# Patient Record
Sex: FEMALE | Race: WHITE | NOT HISPANIC OR LATINO | Employment: FULL TIME | URBAN - METROPOLITAN AREA
[De-identification: names, ages, dates, MRNs, and addresses within clinical notes are randomized per-mention and may not be internally consistent; named-entity substitution may affect disease eponyms.]

---

## 2023-09-13 ENCOUNTER — HOSPITAL ENCOUNTER (OUTPATIENT)
Dept: DATA CONVERSION | Facility: HOSPITAL | Age: 58
End: 2023-09-13
Attending: ORTHOPAEDIC SURGERY | Admitting: ORTHOPAEDIC SURGERY
Payer: COMMERCIAL

## 2023-09-13 DIAGNOSIS — M19.031 PRIMARY OSTEOARTHRITIS, RIGHT WRIST: ICD-10-CM

## 2023-09-13 DIAGNOSIS — M24.541 CONTRACTURE, RIGHT HAND: ICD-10-CM

## 2023-09-13 DIAGNOSIS — M18.11 UNILATERAL PRIMARY OSTEOARTHRITIS OF FIRST CARPOMETACARPAL JOINT, RIGHT HAND: ICD-10-CM

## 2023-09-29 VITALS — WEIGHT: 149.91 LBS | HEIGHT: 60 IN | BODY MASS INDEX: 29.43 KG/M2

## 2023-09-30 NOTE — H&P
"    History & Physical Reviewed:   Pregnant/Lactating:  ·  Are You Pregnant no (1)   ·  Are You Currently Breastfeeding no (1)     I have reviewed the History and Physical dated:  11-Sep-2023   History and Physical reviewed and relevant findings noted. Patient examined to review pertinent physical  findings.: No significant changes   Home Medications Reviewed: no changes noted   Allergies Reviewed: no changes noted     Consent:   COVID-19 Consent:  ·  COVID-19 Risk Consent Surgeon has reviewed key risks related to the risk of kat COVID-19 and if they contract COVID-19 what the risks are.       Electronic Signatures:  Karan Bobo ()  (Signed 13-Sep-2023 10:03)   Authored: History & Physical Reviewed, Consent, Note  Completion      Last Updated: 13-Sep-2023 10:03 by Karan Bobo ()    References:  1.  Data Referenced From \"Patient Profile - Preop v3\" 13-Sep-2023 09:06   "

## 2023-10-01 NOTE — OP NOTE
Post Operative Note:     Post-Procedure Diagnosis: Right thumb CMC arthritis.   Right wrist STT arthritis.   Procedure: 1.   2.   3.   4.   5.   Surgeon: Karan Bobo D.O.   Resident/Fellow/Other Assistant: CHARLINE Stauffer   Estimated Blood Loss (mL): Less than 10 cc   Specimen: no   Findings: Right thumb CMC arthritis.  Right wrist  STT arthritis.     Operative Report Dictated:  Dictation: not applicable - note contains Operative  Report   Operative Report:    Preoperative diagnosis: Right thumb CMC osteoarthritis.  Right hand first webspace contracture..  Right wrist STT arthritis.    Postoperative diagnosis: Right thumb CMC osteoarthritis.  Right hand first webspace contracture.Right wrist STT arthritis.    Procedure planned: Right thumb CMC arthroplasty using Arthrex mini tight rope construct with possible tendon transfer.Partial excision trapezoid for treatment of right wrist arthritis.    Procedure performed: Right thumb CMC arthroplasty using Arthrex mini tight rope construct.  End to side tendon transfer of abductor pollicis longus to flexor carpi radialis for treatment of webspace contracture.Partial resection trapezoid for treatment  of right wrist arthritis.    Surgeon: Karan Bobo D.O.    Assistant: CHARLINE Stauffer  The physician assistant was present to the entire case. Given the nature of the disease process and the procedure to be performed a skilled surgical assistant was necessary during the case. The assistant was necessary in order to hold retractors and directly  assist in the operation. A certified scrub tech was at the back table managing instruments and supplies for the surgical case.    Anesthesia: General    Estimated blood loss: Less than 20 mL    Drains: None    Tourniquet: Approximately 30 minutes at 250 mmHg to the well-padded upper arm    Specimens: None    Implants: Arthrex mini tight rope with FiberWire suture and stainless steel endobuttons    Indications: The patient  presented to the office with painful arthritis affecting right thumb CMC articulation.  The patient described pain and dysfunction on a daily basis.  The patient experienced failure of nonoperative treatment strategies.  After  full discussion regarding risks benefits and alternatives the patient elected to proceed forth with surgery by way of right thumb CMC arthroplasty using Arthrex mini tight rope construct possibly augmented with tendon transfer.  Informed consent was signed  and placed in the chart.    Complications: None noted at the time of surgery    Description of operation: The patient was taken to the operative suite and placed in the supine position on the operating table.  A timeout was performed and the right hand was confirmed to be the operative site.  The patient was carefully positioned  on the table in such a fashion as to pad all bony prominences and peripheral nerves.  The patient was administered appropriate IV antibiotics and general anesthesia.  The patient was then prepped and draped in the normal sterile fashion.  After prepping  and draping an apex volar chevron shaped incision was marked out over the base of the thumb centered over the CMC articulation.  A 1 cm incision was marked out over the metaphyseal diaphyseal junction of the dorsal second metacarpal slightly ulnar to  midline.  A sterile Esmarch was then used to exsanguinate the upper extremity and tourniquet was raised to 250 mmHg. The 15 blade was then used to incise skin over the base of the thumb.  Tenotomy scissors were used to gently dissect down to the subcutaneous  plane, taking care to identify and protect the dorsal sensory branches of the radial nerve.  The sensory branches were protected through the remainder of the case.  We then developed the interval between the extensor pollicis brevis and abductor pollicis  longus.  With the tendons retracted we created a full-thickness capsular incision down to bone directly  overlying the CMC articulation.  Sharp dissection was used to release the soft tissue attachments to the trapezium.  An osteotome was then used to  quarter the trapezium and the trapezium fragments were then removed in a piecemeal fashion with the Rongeur.  Care was taken to avoid iatrogenic injury to the underlying FCR tendon and capsular structures.  The interface between the distal pole scaphoid  and trapezoid was then inspected.  There was found to be full-thickness cartilaginous loss with eburnated subchondral bone.  The rongeur was used to remove the proximal third of the trapezoid eliminating the painful contact in this zone of wrist arthritis.   Range of motion was undertaken and it was demonstrated no contact did occur through range of motion even with axial load.  The guidepin from the Arthrex mini tight rope set was then placed about the radial base of the first metacarpal.  Under fluoroscopic  imaging the pin was delivered from radial to ulnar and proximal to distal obtaining bicortical purchase within the first metacarpal base and second metacarpal shaft.  Care was taken to pass the pin so that it would exit about the ulnar aspect of the second  metacarpal shaft.  We then loaded the suture for the definitive implant through the wire loop of the trailing edge of the guidepin.  The guidepin was then advanced along with the definitive suture construct through a small incision in the dorsal hand.   Dissection was carried down to the second metacarpal shaft exposing a small window of periosteum over the dorsal ulnar aspect of the second metacarpal shaft.  The second stainless steel endo-button was then tied down over the ulnar aspect of the second  metacarpal shaft taking care to set the appropriate tone within the construct.  Fluoroscopic imaging, dynamic assessment and direct inspection were used to set the appropriate tone within the construct avoiding overtightening or undertaking.  Despite  the fact  that the first metacarpal was positioned in the appropriate posture upon tightening there was still evidence for adduction deformity of the metacarpal and webspace contracture.  It was deemed most appropriate to perform end to side transfer of  abductor pollicis longus to flexor carpi radialis tendon in order to impart a greater extension and abduction posture to the metacarpal which would lead to better function.  #2 Ethibond suture was used to create a suture suspension sling by passing suture  to the FCR tendon just proximal to the point where it passes volar to the second metacarpal and also through the abductor pollicis longus tendon at the point where it inserts at the radial aspect of the first metacarpal base, creating 2 passes through  each tendon.  When tightened the suture suspension sling improved the posture of the first metacarpal creating more abduction and extension copious irrigation was then performed.  The tourniquet was deflated.  Meticulous hemostasis was achieved.  Vital  stitches were used to close the capsular layer over the CMC articulation.  Interrupted nylon stitches were used to close the skin.  The patient was then placed into a nonadherent dressing and thumb spica short arm plaster splint.  The patient was then  allowed to arise from general anesthesia and taken to recovery in stable condition.  Overall the patient tolerated the procedure well.    Disposition: Stable to PACU        Electronic Signatures:  Karan Bobo ()  (Signed 13-Sep-2023 13:05)   Authored: Post Operative Note, Note Completion      Last Updated: 13-Sep-2023 13:05 by Karan Bobo ()

## 2023-10-04 PROBLEM — M79.18 MYOFASCIAL PAIN: Status: ACTIVE | Noted: 2023-10-04

## 2023-10-04 PROBLEM — F52.6 SEXUAL PAIN DISORDER: Status: ACTIVE | Noted: 2023-10-04

## 2023-10-04 PROBLEM — G56.00 CTS (CARPAL TUNNEL SYNDROME): Status: ACTIVE | Noted: 2023-10-04

## 2023-10-04 PROBLEM — M65.9 FCR (FLEXOR CARPI RADIALIS) TENOSYNOVITIS: Status: ACTIVE | Noted: 2023-10-04

## 2023-10-04 PROBLEM — N95.2 ATROPHIC VAGINITIS: Status: ACTIVE | Noted: 2023-10-04

## 2023-10-04 RX ORDER — MONTELUKAST SODIUM 10 MG/1
10 TABLET ORAL DAILY
COMMUNITY
Start: 2023-09-02

## 2023-10-04 RX ORDER — OXYCODONE AND ACETAMINOPHEN 5; 325 MG/1; MG/1
TABLET ORAL
COMMUNITY
Start: 2023-09-11

## 2023-10-04 RX ORDER — ESTRADIOL 10 UG/1
10 INSERT VAGINAL 2 TIMES WEEKLY
COMMUNITY
Start: 2021-06-11

## 2023-10-04 RX ORDER — ENOXAPARIN SODIUM 100 MG/ML
INJECTION SUBCUTANEOUS
COMMUNITY
Start: 2023-04-24

## 2023-10-04 RX ORDER — LIDOCAINE 50 MG/G
1 PATCH TOPICAL EVERY 24 HOURS
COMMUNITY
Start: 2023-08-16

## 2023-10-04 RX ORDER — BREMELANOTIDE 1.75 MG/.3ML
INJECTION SUBCUTANEOUS
COMMUNITY
Start: 2021-06-10

## 2023-10-04 RX ORDER — ONDANSETRON 4 MG/1
TABLET, FILM COATED ORAL
COMMUNITY
Start: 2023-04-24

## 2023-10-04 RX ORDER — DICLOFENAC SODIUM 10 MG/G
GEL TOPICAL
COMMUNITY
Start: 2023-09-08

## 2023-10-04 RX ORDER — CEFADROXIL 500 MG/1
CAPSULE ORAL
COMMUNITY
Start: 2023-04-24

## 2023-10-04 RX ORDER — OXYCODONE HYDROCHLORIDE 5 MG/1
TABLET ORAL
COMMUNITY
Start: 2023-04-24

## 2023-10-05 ENCOUNTER — TREATMENT (OUTPATIENT)
Dept: OCCUPATIONAL THERAPY | Facility: CLINIC | Age: 58
End: 2023-10-05
Payer: COMMERCIAL

## 2023-10-05 DIAGNOSIS — M18.11 PRIMARY OSTEOARTHRITIS OF FIRST CARPOMETACARPAL JOINT OF RIGHT HAND: Primary | ICD-10-CM

## 2023-10-05 PROCEDURE — 97140 MANUAL THERAPY 1/> REGIONS: CPT | Mod: GO | Performed by: OCCUPATIONAL THERAPIST

## 2023-10-05 PROCEDURE — 97110 THERAPEUTIC EXERCISES: CPT | Mod: GO | Performed by: OCCUPATIONAL THERAPIST

## 2023-10-05 PROCEDURE — 97530 THERAPEUTIC ACTIVITIES: CPT | Mod: GO | Performed by: OCCUPATIONAL THERAPIST

## 2023-10-05 NOTE — PROGRESS NOTES
"  Occupational Therapy     Occupational Therapy Treatment  Marion Montoya   [id number]   1965          Current Problem:   Right CMC OA s/p arthroplasty surgery       Visit Number: 2    Insurance Authorization Request: N/A    Assessment:  Decreased ADL status, Decreased upper extremity range of motion, Decreased upper extremity strength, Decreased fine motor control, Decreased IADLs   Patient is progressing appropriately demonstrated by decreased pain at rest. She remains limited by pain, edema, joint stiffness, capsular tightness, and muscle weakness. Added wrist and digits A/AAROM exercsies to today's session and educated patient on benefits of performing light functional activities with right hand to facilitate motion and control pain. Patient will continue to benefit from skilled OT for pain/edema management, ROM, and later progress to strengthening to support full return to all ADL/IADL, work, and leisure activities.        Plan:  Outpatient Plan  OT Plan: Continue with PoC  Frequency: 1x/wk  Duration: 6 weeks  Rehab Potential: Good  Plan of Care Agreement: Patient      Subjective   General:  \"My fingers are bending more now and I can do small circles with my thumb.\"   \"I can type for work but I can't use my thumb.\"    Precautions: No heavy lifting/grasping, No WB      Pain Assessment:  Location: right thumb and radial side wrist.   3-4/10 at rest, 8/10 at highest  Description: Shooting, sharp, burning       HEP Adherence/Understanding: Good     Objective  Observation: Mod edema presented in right radial side wrist, basal thumb, and all digits. Guarding behavior observed towards right UE.    Palpation: Tender to touch over dorsum right wrist.     Range of motion (in degrees)  AROM  Right wrist ext 30 flex 40 sup 60 pro 85  Right digits to DPC IF 2 cm  MF 2 cm  RF 3 cm  SF 3 cm   Right thumb MCP 0/0  IP 0/10  Right thumb radial ABD 30 palmar ABD 25  Right thumb opposition IF     Sensory:  Patient reports " numbness along radial side forearm, wrist, and thumb     Strength: TBT due to pain   strength R   L   Pinch strength Lateral pinch R   L     Treatment Interventions:   Therapeutic Exercise  Therapeutic Exercise Performed: Yes   Therapist completed PROM of digits into isolated and composite flexion using low load long duration technique. Instructed patient to self perform wrist AAROM ball roll flex/ext/sup; sponge squeeze in elevation; digits into ABD.   Therapeutic Activity  Therapeutic Activity Performed: Yes   Sponge cubes picking up and trapping  Manual Therapy Performed: Yes    Retrograde edema mob with right hand in elevation. Issued patient size small compression glove to wear at night for edema control.     Tolerance of session: Some difficulty due to pain    Outcome Measures:  Quick Dash Score: at eval 81.82%     OP EDUCATION:  Education  Individual(s) Educated: Patient  Education Provided: Edema control  Home Program: AAROM, AROM, Handout issued  Patient/Caregiver Demonstrated Understanding: yes  Plan of Care Discussed and Agreed Upon: yes  Patient Response to Education: Patient/Caregiver Verbalized Understanding of Information, Patient/Caregiver Performed Return Demonstration of Exercises/Activities, Patient/Caregiver Asked Appropriate Questions    Goals: (by discharge 6 weeks)  By discharge MELANIE ORNELAS will achieve the following goals:     1. Patient to demonstrate AROM thumb MCP flexion 20 IP 30 degrees for dressing   2. Patient to demonstrate AROM wrist ext 40 degrees for grooming and pushing self up.   3. Patient to lift and carry 10# with right hand with no difficulty for household chores  4. Patient to demonstrate lateral pinching strength right same with left for cooking and reading tasks.  5. Patient to demonstrate proper technique and competence with HEP   6. Patient to score disability rate less than 10% on Quick DASH

## 2023-10-12 ENCOUNTER — TREATMENT (OUTPATIENT)
Dept: OCCUPATIONAL THERAPY | Facility: CLINIC | Age: 58
End: 2023-10-12
Payer: COMMERCIAL

## 2023-10-12 DIAGNOSIS — M18.11 PRIMARY OSTEOARTHRITIS OF FIRST CARPOMETACARPAL JOINT OF RIGHT HAND: Primary | ICD-10-CM

## 2023-10-12 PROCEDURE — 97110 THERAPEUTIC EXERCISES: CPT | Mod: GO | Performed by: OCCUPATIONAL THERAPIST

## 2023-10-12 NOTE — PROGRESS NOTES
"  Occupational Therapy     Occupational Therapy Treatment  Marion Montoya   [id number]   1965   Time Calculation  Start Time: 1100  Stop Time: 1145  Time Calculation (min): 45 min     Current Problem:   Right CMC OA s/p arthroplasty surgery     Visit Number: 3    Insurance Authorization Request: N/A    Assessment:  Decreased ADL status, Decreased upper extremity range of motion, Decreased upper extremity strength, Decreased fine motor control, Decreased IADLs   Patient is progressing appropriately demonstrated by improved MF-SF AROM into flexion, wrist into flex/ext/sup. She remains limited by pain, edema, joint stiffness, capsular tightness, and muscle weakness. Added thumb PROM and MPC blocking exercises to today's session to decrease joint stiffness and capsular tightness. Adjusted thumb spica splint to hand based to allow full wrist motion. Also issued her a medium compression glove to wear at night as she reports that size small glove causes pain. Patient will continue to benefit from skilled OT for pain/edema management, ROM, and later progress to strengthening to support full return to all ADL/IADL, work, and leisure activities.        Plan:  Outpatient Plan  OT Plan: Continue with PoC  Frequency: 1x/wk  Duration: 6 weeks  Rehab Potential: Good  Plan of Care Agreement: Patient      Subjective   General:  \"I can move my thumb better but it still hurts. I can hold a plate now. I can eat chips this this hand. I can't do it for too long but I am doing more.\"    Precautions: No heavy lifting/grasping, No WB    Pain Assessment:  Location: right thumb and radial side wrist.   4/10 at rest, 7/10 (was 8) at highest  Description: Burning, achy and shooting.      HEP Adherence/Understanding: Good     Objective  Observation: Mod edema presented in right radial side wrist, basal thumb, and all digits. Less guarding behavior observed towards right UE comparing to last visit.    Palpation: Tender to touch over dorsum right " wrist.     Range of motion (in degrees)  AROM  Right wrist ext 35 (+5) flex 45 (+5)  sup 70 (+10) pro 85  Right digits to DPC IF 2 cm  MF 0 cm (was 2 cm)  RF 0 cm (was 3 cm)  SF 0 cm (was 3 cm)   Right thumb MCP 0/0  IP 0/15 (+5)  Right thumb radial ABD 30 palmar ABD 25  Right thumb opposition MF (was to IF)     Sensory:  Patient reports decreased numbness along radial side forearm, wrist, and thumb     Strength: TBT due to pain   strength R   L   Pinch strength Lateral pinch R   L     Treatment Interventions:   Therapeutic Exercise  Therapeutic Exercise Performed: Yes   Therapist completed PROM of thumb MCP into flexion and IF into composite flexion using low load long duration technique. Instructed patient to self perform thumb MCP flexion stretch, thumb MCP blocking with 5 sec hold at end range; opposition to RF fingertip with 5 sec hold at end range.   Therapeutic Activity  Therapeutic Activity Performed: Yes   9 hole peg placement with normal pinching pattern and opposition IF-RF.       Tolerance of session: Some difficulty due to pain    Outcome Measures:  Quick Dash Score: at eval 81.82%     OP EDUCATION:  Education  Individual(s) Educated: Patient  Education Provided: Edema control  Home Program: PROM, AROM, Edema control, Handout issued  Patient/Caregiver Demonstrated Understanding: yes  Plan of Care Discussed and Agreed Upon: yes  Patient Response to Education: Patient/Caregiver Verbalized Understanding of Information, Patient/Caregiver Performed Return Demonstration of Exercises/Activities, Patient/Caregiver Asked Appropriate Questions    Goals: (by discharge 6 weeks)  By discharge MELANIE ORNELAS will achieve the following goals:     1. Patient to demonstrate AROM thumb MCP flexion 20 IP 30 degrees for dressing   2. Patient to demonstrate AROM wrist ext 40 degrees for grooming and pushing self up.   3. Patient to lift and carry 10# with right hand with no difficulty for household chores  4. Patient to  demonstrate lateral pinching strength right same with left for cooking and reading tasks.  5. Patient to demonstrate proper technique and competence with HEP   6. Patient to score disability rate less than 10% on Quick DASH

## 2023-10-19 ENCOUNTER — TREATMENT (OUTPATIENT)
Dept: OCCUPATIONAL THERAPY | Facility: CLINIC | Age: 58
End: 2023-10-19
Payer: COMMERCIAL

## 2023-10-19 DIAGNOSIS — M18.11 PRIMARY OSTEOARTHRITIS OF FIRST CARPOMETACARPAL JOINT OF RIGHT HAND: Primary | ICD-10-CM

## 2023-10-19 PROCEDURE — 97110 THERAPEUTIC EXERCISES: CPT | Mod: GO | Performed by: OCCUPATIONAL THERAPIST

## 2023-10-19 NOTE — PROGRESS NOTES
"  Occupational Therapy     Occupational Therapy Treatment  Marion Montoya   [id number]   1965   Time Calculation  Start Time: 1100  Stop Time: 1145  Time Calculation (min): 45 min     Current Problem:   Right CMC OA s/p arthroplasty surgery     Visit Number: 4    Insurance Authorization Request: N/A    Assessment:  Decreased ADL status, Decreased upper extremity range of motion, Decreased upper extremity strength, Decreased IADLs   Patient is progressing appropriately demonstrated by reduced pain and improved AROM thumb opposition. She remains limited by pain, edema, joint stiffness, capsular tightness, and muscle weakness. Added [employer] [place] dexterity board activities to today's session to promote in-hand manipulation and dexterity skills. Patient will continue to benefit from skilled OT for pain/edema management, ROM, and later progress to strengthening to support full return to all ADL/IADL, work, and leisure activities.        Plan:  Outpatient Plan  OT Plan: Continue with PoC  Frequency: 1x/wk  Duration: 6 weeks  Rehab Potential: Good  Plan of Care Agreement: Patient      Subjective   General:  \"I opened a dog food can by myself today. I can hold things better.\" \"Typing is still hard, it hurts my index finger.\"     Precautions: No heavy lifting/grasping, No WB    Pain Assessment:  Location: right thumb and radial side wrist.   2-3/10 at rest (was 3-4), 6-7/10 (was 7) at highest  Description: Burning, achy and shooting.      HEP Adherence/Understanding: Good     Objective  Observation: Mod edema presented in right radial side wrist, basal thumb, and all digits. Less guarding behavior observed towards right UE comparing to last visit.    Palpation: Minimal tenderness to touch over dorsum right wrist.     Range of motion (in degrees)  AROM  Right wrist ext 35 flex 45  sup 70 pro 85  Right digits to DPC IF 2 cm  MF 0 cm  RF 0 cm SF 0 cm   Right thumb MCP 0/0  IP 0/25 (+10)  Right thumb radial ABD 30 palmar ABD 25  Right " thumb opposition RF (was to MF)     Sensory:  Patient reports no more numbness in right hand     Strength: TBT due to pain   strength R   L   Pinch strength Lateral pinch R   L     Treatment Interventions:   Therapeutic Exercise  Therapeutic Exercise Performed: Yes   Therapist completed PROM of thumb MCP into flexion and IF into composite flexion using low load long duration technique. Instructed patient to self perform thumb MCP blocking with 5 sec hold at end range; opposition to SF fingertip with 5 sec hold at end range.   Therapeutic Activity  Therapeutic Activity Performed: Yes   [employer] dexterity board - peg placement and trap with placement      Tolerance of session: Some difficulty due to pain    Outcome Measures:  Quick Dash Score: at eval 81.82%     OP EDUCATION:  Education  Individual(s) Educated: Patient  Home Program: AROM, PROM  Patient/Caregiver Demonstrated Understanding: yes  Plan of Care Discussed and Agreed Upon: yes  Patient Response to Education: Patient/Caregiver Verbalized Understanding of Information, Patient/Caregiver Performed Return Demonstration of Exercises/Activities, Patient/Caregiver Asked Appropriate Questions    Goals: (by discharge 6 weeks)  By discharge MELANIE ORNELAS will achieve the following goals:     1. Patient to demonstrate AROM thumb MCP flexion 20 IP 30 degrees for dressing   2. Patient to demonstrate AROM wrist ext 40 degrees for grooming and pushing self up.   3. Patient to lift and carry 10# with right hand with no difficulty for household chores  4. Patient to demonstrate lateral pinching strength right same with left for cooking and reading tasks.  5. Patient to demonstrate proper technique and competence with HEP   6. Patient to score disability rate less than 10% on Quick DASH

## 2023-10-24 ENCOUNTER — OFFICE VISIT (OUTPATIENT)
Dept: ORTHOPEDIC SURGERY | Facility: CLINIC | Age: 58
End: 2023-10-24
Payer: COMMERCIAL

## 2023-10-24 ENCOUNTER — ANCILLARY PROCEDURE (OUTPATIENT)
Dept: RADIOLOGY | Facility: CLINIC | Age: 58
End: 2023-10-24
Payer: COMMERCIAL

## 2023-10-24 DIAGNOSIS — M18.11 PRIMARY OSTEOARTHRITIS OF FIRST CARPOMETACARPAL JOINT OF RIGHT HAND: Primary | ICD-10-CM

## 2023-10-24 DIAGNOSIS — M18.11 PRIMARY OSTEOARTHRITIS OF FIRST CARPOMETACARPAL JOINT OF RIGHT HAND: ICD-10-CM

## 2023-10-24 PROCEDURE — 99024 POSTOP FOLLOW-UP VISIT: CPT | Performed by: ORTHOPAEDIC SURGERY

## 2023-10-24 PROCEDURE — 73140 X-RAY EXAM OF FINGER(S): CPT | Mod: RIGHT SIDE | Performed by: ORTHOPAEDIC SURGERY

## 2023-10-24 PROCEDURE — 73140 X-RAY EXAM OF FINGER(S): CPT | Mod: RT

## 2023-10-24 NOTE — PROGRESS NOTES
10/24/2023    Chief Complaint   Patient presents with    Left Hand - Follow-up     Lt thumb cmc arthroplasty  DOS: 9/1/323  Xrays today       History of Present Illness:  Patient Marion Montoya , 58 y.o. female, presents today, 10/24/2023, for evaluation of right thumb  CMC arthroplasty, 6 weeks postop .  Marion states she is doing well overall.  Minimal to moderate soreness and stiffness that continues to improve, she states this is been especially noticeable over the last week or so.  She working with therapy and motion recovery, they have also slammed down her brace.  Overall she feels she is making progress of progress.       Review of Systems:   GENERAL: Negative  GI: Negative  MUSCULOSKELETAL: See HPI  SKIN: Negative  NEURO:  Negative     Physical Exam:  GENERAL:  Alert and oriented to person, place, and time.  No acute distress and breathing comfortably; pleasant and cooperative with the examination.  HEENT:  Head is normocephalic and atraumatic.  NECK:  Supple, no visible swelling.  CARDIOVASCULAR:  No palpable tachycardia.  LUNGS:  No audible wheezing or labored breathing.  ABDOMEN:  Nondistended.  Extremities: Evaluation of the right upper extremity finds the patient to have a palpable radial artery at the wrist with brisk capillary refill to all digits. The patient has intact sensorium to axillary, radial, median and ulnar nerves. There are no open wounds. There are no signs of infection. There is no evidence of lymphedema or lymphatic streaking. The patient has supple compartments of the right arm, forearm and hand.  She does have mild diffuse swelling to the digits and hand, this continues to improve for the patient.  Surgical incisions are well-healed.  She has full composite fist no extensor lag or rotational deformity.  With flexion of the thumb she can comfortably reach the tip of the ring finger she has to actively work to reach the tip of the small finger.     Imaging:  3 views of the right thumb  taken in the [place] office today show no acute fracture dislocation.  Previous surgical resection of trapezium is noted, indwelling Endobutton construct shows adequate maintenance of spacing and alignment throughout all 3 planes.     Assessment:  Right thumb CMC arthroplasty 6 weeks postop with minimal stiffness.     Plan:  Recommendations are made wean from splint immobilization.  Progress to 1 to 2 pounds maximum weightbearing to the right upper extremity.  Continue work with therapy on range of motion, endurance, strengthening under OT guidance.  Follow-up again with our office in 6 weeks for repeat clinical and radiographic exam.  X-rays 3 views right thumb upon return.  All questions answered at today's visit.    Carey Orta PA-C

## 2023-10-26 ENCOUNTER — TREATMENT (OUTPATIENT)
Dept: OCCUPATIONAL THERAPY | Facility: CLINIC | Age: 58
End: 2023-10-26
Payer: COMMERCIAL

## 2023-10-26 DIAGNOSIS — M18.11 PRIMARY OSTEOARTHRITIS OF FIRST CARPOMETACARPAL JOINT OF RIGHT HAND: Primary | ICD-10-CM

## 2023-10-26 PROCEDURE — 97530 THERAPEUTIC ACTIVITIES: CPT | Mod: GO | Performed by: OCCUPATIONAL THERAPIST

## 2023-10-26 PROCEDURE — 97110 THERAPEUTIC EXERCISES: CPT | Mod: GO | Performed by: OCCUPATIONAL THERAPIST

## 2023-10-26 PROCEDURE — 97140 MANUAL THERAPY 1/> REGIONS: CPT | Mod: GO | Performed by: OCCUPATIONAL THERAPIST

## 2023-10-26 NOTE — PROGRESS NOTES
"  Occupational Therapy     Occupational Therapy Treatment  Marion Montoya   [id number]   1965   Time Calculation  Start Time: 1100  Stop Time: 1145  Time Calculation (min): 45 min     Current Problem:   Right CMC OA s/p arthroplasty surgery     Visit Number: 5    Insurance Authorization Request: N/A    Assessment:  Decreased ADL status, Decreased upper extremity range of motion, Decreased upper extremity strength, Decreased IADLs   Patient is progressing appropriately demonstrated by reduced pain and improved AROM thumb IP into flexion. She remains limited by pain, edema, joint stiffness, capsular tightness, and muscle weakness. Added isometric tennis ball  exercise to today's session to promote intrinsic muscle strength and basal thumb joint stability. Also educated her on utilization of electrical toothbrush and scar desensitization technique to decrease pain and hypersensitivity. Patient will continue to benefit from skilled OT for pain/edema management, ROM, and progressive strengthening to support full return to all ADL/IADL, work, and leisure activities.        Plan:  Outpatient Plan  OT Plan: Continue with PoC  Frequency: 1x/wk  Duration: 6 weeks  Rehab Potential: Good  Plan of Care Agreement: Patient      Subjective   General:  \"I held a coffee mug without the brace on. I feel like I am moving better. \" \"I am using my thumb to type too now.\"     Precautions: No heavy lifting/grasping, No WB    Pain Assessment:  Location: right thumb and radial side wrist.   1-2/10 at rest (was 2-3), 5/10 (was 6-7) at highest  Description: Burning, achy and shooting.      HEP Adherence/Understanding: Good     Objective  Observation: Mod edema presented in right radial side wrist, basal thumb, and all digits. Less guarding behavior observed towards right UE comparing to last visit.    Palpation: Minimal tenderness to touch over dorsum right wrist.     Range of motion (in degrees)  AROM  Right wrist ext 35 flex 45  sup 70 " pro 85  Right digits to DPC IF 2 cm  MF 0 cm  RF 0 cm SF 0 cm   Right thumb MCP 0/0  IP 0/40 (was 25)  Right thumb radial ABD 30 palmar ABD 25  Right thumb opposition RF      Sensory:  Patient reports no more numbness in right hand     Strength: TBT due to pain   strength R   L   Pinch strength Lateral pinch R   L     Treatment Interventions:   Therapeutic Exercise  Therapeutic Exercise Performed: Yes   Therapist completed PROM of thumb MCP into flexion and IF into composite flexion using low load long duration technique. Instructed patient to self perform thumb MCP passive end range stretching. Tennis ball isometric  with forearm in various positions.   Therapeutic Activity  Therapeutic Activity Performed: Yes   Small peg placement with opposition. Tweezers board with and without tweezers.    Manual Therapy Performed: Yes   Mini vibration massager over 1st web space and scar.     Educated patient on sensory re-education over scar with various materials for desensitization.     Tolerance of session: Some difficulty due to pain    Outcome Measures:  Quick Dash Score: at eval 81.82%     OP EDUCATION:  Education  Individual(s) Educated: Patient  Home Program: PROM  Patient/Caregiver Demonstrated Understanding: yes  Plan of Care Discussed and Agreed Upon: yes  Patient Response to Education: Patient/Caregiver Verbalized Understanding of Information, Patient/Caregiver Performed Return Demonstration of Exercises/Activities, Patient/Caregiver Asked Appropriate Questions    Goals: (by discharge 6 weeks)  By discharge MELANIE ORNELAS will achieve the following goals:     1. Patient to demonstrate AROM thumb MCP flexion 20 IP 30 degrees for dressing   2. Patient to demonstrate AROM wrist ext 40 degrees for grooming and pushing self up.   3. Patient to lift and carry 10# with right hand with no difficulty for household chores  4. Patient to demonstrate lateral pinching strength right same with left for cooking and reading  tasks.  5. Patient to demonstrate proper technique and competence with HEP   6. Patient to score disability rate less than 10% on Quick DASH

## 2023-10-31 ENCOUNTER — TREATMENT (OUTPATIENT)
Dept: OCCUPATIONAL THERAPY | Facility: CLINIC | Age: 58
End: 2023-10-31
Payer: COMMERCIAL

## 2023-10-31 DIAGNOSIS — M18.11 PRIMARY OSTEOARTHRITIS OF FIRST CARPOMETACARPAL JOINT OF RIGHT HAND: Primary | ICD-10-CM

## 2023-10-31 PROCEDURE — 97110 THERAPEUTIC EXERCISES: CPT | Mod: GO | Performed by: OCCUPATIONAL THERAPIST

## 2023-10-31 PROCEDURE — 97530 THERAPEUTIC ACTIVITIES: CPT | Mod: GO | Performed by: OCCUPATIONAL THERAPIST

## 2023-10-31 NOTE — PROGRESS NOTES
"  Occupational Therapy     Occupational Therapy Treatment  Name: Marion Montoya   MRN: [id number]   : 1965   Time Calculation  Start Time: 1102  Stop Time: 1141  Time Calculation (min): 39 min     Current Problem:   Right CMC OA s/p arthroplasty surgery     Visit Number: 6    Insurance Authorization Request: N/A    Assessment:  Decreased ADL status, Decreased upper extremity range of motion, Decreased upper extremity strength, Decreased IADLs   Patient is progressing appropriately demonstrated by improved AROM wrist into flex/ext, thumb MCP and IP into flexion. She remains limited by pain, edema, joint stiffness, capsular tightness, and muscle weakness. Added active sign language exercise in conjunction with fluido and tweezer board activities to today's session to facilitate thumb motion and promote functional pinch. Patient will continue to benefit from skilled OT for pain/edema management, ROM, and progressive strengthening to support full return to all ADL/IADL, work, and leisure activities.        Plan:  Outpatient Plan  OT Plan: Continue with PoC  Frequency: 1x/wk  Duration: 6 weeks  Rehab Potential: Good  Plan of Care Agreement: Patient      Subjective   General:  \"Yesterday I was touching my pinky a lot but this morning is a little stiff. I was folding towels with this hand.\"     Precautions: No heavy lifting/grasping, No WB    Pain Assessment:  Location: right thumb and radial side wrist.   2-3/10 at rest, 4-5/10 at highest  Description: Burning, achy and shooting.      HEP Adherence/Understanding: Good     Objective  Observation: min edema presented in right radial side wrist, basal thumb, and all digits. Less guarding behavior observed towards right UE comparing to last visit.    Palpation: Minimal tenderness to touch over dorsum right wrist.     Range of motion (in degrees)  AROM  Right wrist ext 40 (was 35) flex 65 (was 45)  sup 70 pro 85  Right digits to DPC IF 0.5 cm (was 2 cm)  MF 0 cm  RF 0 cm " SF 0 cm   Right thumb MCP 0/15 (was 0)  IP 0/50 (was 40)  Right thumb radial ABD 30 palmar ABD 25  Right thumb opposition RF      Sensory:  Patient reports no more numbness in right hand     Strength: TBT due to pain   strength R   L   Pinch strength Lateral pinch R   L     Treatment Interventions:   Therapeutic Exercise  Therapeutic Exercise Performed: Yes   Patient self performed active sign language in conjunction with fluido therapy.   Therapist completed PROM of thumb MCP into flexion and IF into composite flexion using low load long duration technique.    Therapeutic Activity  Therapeutic Activity Performed: Yes   Tweezers board with and without tweezers.        Tolerance of session: Some difficulty due to pain    Outcome Measures:  Quick Dash Score: at eval 81.82%     OP EDUCATION:  Education  Individual(s) Educated: Patient  Patient/Caregiver Demonstrated Understanding: yes  Plan of Care Discussed and Agreed Upon: yes  Patient Response to Education: Patient/Caregiver Verbalized Understanding of Information, Patient/Caregiver Performed Return Demonstration of Exercises/Activities, Patient/Caregiver Asked Appropriate Questions    Goals: (by discharge 6 weeks)  By discharge MELANIE ORNELAS will achieve the following goals:     1. Patient to demonstrate AROM thumb MCP flexion 20 IP 30 degrees for dressing   2. Patient to demonstrate AROM wrist ext 40 degrees for grooming and pushing self up.   3. Patient to lift and carry 10# with right hand with no difficulty for household chores  4. Patient to demonstrate lateral pinching strength right same with left for cooking and reading tasks.  5. Patient to demonstrate proper technique and competence with HEP   6. Patient to score disability rate less than 10% on Quick DASH

## 2023-11-02 ENCOUNTER — APPOINTMENT (OUTPATIENT)
Dept: OCCUPATIONAL THERAPY | Facility: CLINIC | Age: 58
End: 2023-11-02
Payer: COMMERCIAL

## 2023-11-14 ENCOUNTER — TELEPHONE (OUTPATIENT)
Dept: ORTHOPEDIC SURGERY | Facility: CLINIC | Age: 58
End: 2023-11-14
Payer: COMMERCIAL

## 2023-11-14 NOTE — TELEPHONE ENCOUNTER
Patient called and left a voicemail , she has questions about post surgery pain. She has finished her physical therapy and wants to know what exercises needs to done that she can do at home.      She can be reached at [phone]

## 2023-11-28 ENCOUNTER — OFFICE VISIT (OUTPATIENT)
Dept: ORTHOPEDIC SURGERY | Facility: CLINIC | Age: 58
End: 2023-11-28
Payer: COMMERCIAL

## 2023-11-28 ENCOUNTER — ANCILLARY PROCEDURE (OUTPATIENT)
Dept: RADIOLOGY | Facility: CLINIC | Age: 58
End: 2023-11-28
Payer: COMMERCIAL

## 2023-11-28 ENCOUNTER — APPOINTMENT (OUTPATIENT)
Dept: OCCUPATIONAL THERAPY | Facility: CLINIC | Age: 58
End: 2023-11-28
Payer: COMMERCIAL

## 2023-11-28 DIAGNOSIS — M79.644 PAIN OF RIGHT THUMB: ICD-10-CM

## 2023-11-28 DIAGNOSIS — M79.645 PAIN OF LEFT THUMB: ICD-10-CM

## 2023-11-28 PROCEDURE — 99024 POSTOP FOLLOW-UP VISIT: CPT | Performed by: ORTHOPAEDIC SURGERY

## 2023-11-28 PROCEDURE — 73140 X-RAY EXAM OF FINGER(S): CPT | Mod: RIGHT SIDE | Performed by: ORTHOPAEDIC SURGERY

## 2023-11-28 PROCEDURE — 73140 X-RAY EXAM OF FINGER(S): CPT | Mod: RT

## 2023-11-28 NOTE — PROGRESS NOTES
History present illness: Patient continues to make progress status post right thumb CMC arthroplasty.  There is a bit of pain along the FCR tendon.        Physical examination:  General: Alert and oriented to person, place, and time.  No acute distress and breathing comfortably: Pleasant and cooperative with examination.  Extremities: Evaluation of the right upper extremity finds the patient had palpable radial artery at the wrist with brisk capillary refill to all digits.  Patient has intact sensation to axillary radial median and ulnar nerves.  There are no open wounds.  There are no signs of infection.  There is no evidence of lymphedema or lymphatic streaking.  The patient has supple compartments to right arm forearm and hand.  Mild tenderness and swelling over distal course of right wrist FCR tendon      Diagnostic studies:      Assessment: Status post right thumb CMC arthroplasty, doing well      Plan: Patient is making nice progress.  She elects for continuance of home exercise program.  If the FCR tenderness and swelling persist she understands she can come back for a steroid injection.  We will leave that to her discretion.  She can follow-up with me on an as-needed basis.  She is okay for activities to tolerance.      Procedure:        Procedure:

## 2023-12-18 ENCOUNTER — ANCILLARY PROCEDURE (OUTPATIENT)
Dept: RADIOLOGY | Facility: CLINIC | Age: 58
End: 2023-12-18
Payer: COMMERCIAL

## 2023-12-18 ENCOUNTER — OFFICE VISIT (OUTPATIENT)
Dept: ORTHOPEDIC SURGERY | Facility: CLINIC | Age: 58
End: 2023-12-18
Payer: COMMERCIAL

## 2023-12-18 DIAGNOSIS — M18.11 PRIMARY OSTEOARTHRITIS OF FIRST CARPOMETACARPAL JOINT OF RIGHT HAND: Primary | ICD-10-CM

## 2023-12-18 DIAGNOSIS — M18.11 PRIMARY OSTEOARTHRITIS OF FIRST CARPOMETACARPAL JOINT OF RIGHT HAND: ICD-10-CM

## 2023-12-18 PROCEDURE — 73130 X-RAY EXAM OF HAND: CPT | Mod: RT

## 2023-12-18 PROCEDURE — 99213 OFFICE O/P EST LOW 20 MIN: CPT | Performed by: ORTHOPAEDIC SURGERY

## 2023-12-18 PROCEDURE — 73130 X-RAY EXAM OF HAND: CPT | Mod: RIGHT SIDE | Performed by: RADIOLOGY

## 2023-12-18 PROCEDURE — 1036F TOBACCO NON-USER: CPT | Performed by: ORTHOPAEDIC SURGERY

## 2023-12-18 NOTE — PROGRESS NOTES
History present illness: Patient presents status post fall approximately 1 week ago injuring the right hand.  She status post right thumb CMC arthroplasty.  That was done December 13, 2023.  She localizes pain about the base of the thumb and the radial wrist.        Physical examination:  General: Alert and oriented to person, place, and time.  No acute distress and breathing comfortably: Pleasant and cooperative with examination.  Extremities: Evaluation of the right upper extremity finds the patient had palpable radial artery at the wrist with brisk capillary refill to all digits.  Patient has intact sensation to axillary radial median and ulnar nerves.  There are no open wounds.  There are no signs of infection.  There is no evidence of lymphedema or lymphatic streaking.  The patient has supple compartments to right arm forearm and hand.  Mild swelling through zone of previous surgical dissection from right thumb CMC arthroplasty.  Thumb is stable to gentle stressing      Diagnostic studies: X-rays of right hand to include wrist show no acute fracture or dislocation      Assessment: Right hand contusion      Plan: Recommendations were made for motion exercises through home program and for activities to tolerance.  Patient can follow-up with me on an as-needed basis.      Procedure:        Procedure:

## 2024-01-26 DIAGNOSIS — M18.11 PRIMARY OSTEOARTHRITIS OF FIRST CARPOMETACARPAL JOINT OF RIGHT HAND: Primary | ICD-10-CM

## 2024-01-30 ENCOUNTER — EVALUATION (OUTPATIENT)
Dept: OCCUPATIONAL THERAPY | Facility: CLINIC | Age: 59
End: 2024-01-30
Payer: COMMERCIAL

## 2024-01-30 DIAGNOSIS — M18.11 PRIMARY OSTEOARTHRITIS OF FIRST CARPOMETACARPAL JOINT OF RIGHT HAND: ICD-10-CM

## 2024-01-30 PROCEDURE — 97165 OT EVAL LOW COMPLEX 30 MIN: CPT | Mod: GO | Performed by: OCCUPATIONAL THERAPIST

## 2024-01-30 PROCEDURE — 97110 THERAPEUTIC EXERCISES: CPT | Mod: GO | Performed by: OCCUPATIONAL THERAPIST

## 2024-01-30 ASSESSMENT — PATIENT HEALTH QUESTIONNAIRE - PHQ9
SUM OF ALL RESPONSES TO PHQ9 QUESTIONS 1 AND 2: 0
1. LITTLE INTEREST OR PLEASURE IN DOING THINGS: NOT AT ALL
2. FEELING DOWN, DEPRESSED OR HOPELESS: NOT AT ALL

## 2024-01-30 ASSESSMENT — ENCOUNTER SYMPTOMS
OCCASIONAL FEELINGS OF UNSTEADINESS: 0
LOSS OF SENSATION IN FEET: 0
DEPRESSION: 0

## 2024-01-30 ASSESSMENT — PAIN - FUNCTIONAL ASSESSMENT: PAIN_FUNCTIONAL_ASSESSMENT: 0-10

## 2024-01-30 ASSESSMENT — PAIN SCALES - GENERAL: PAINLEVEL_OUTOF10: 1

## 2024-01-30 ASSESSMENT — PAIN DESCRIPTION - DESCRIPTORS: DESCRIPTORS: SHARP

## 2024-01-30 NOTE — PROGRESS NOTES
Occupational Therapy    Occupational Therapy Evaluation    Name: Marion Montoya  MRN: [id number]  : 1965   DATE: 24   Time Calculation  Start Time: 0300  Stop Time: 0345  Time Calculation (min): 45 min     Insurance Authorization Request: ANGIE BMN PT OT [facility] 10 DED 2000(0) 4000(0)  COVERAGE 80/100 OOP [id number]([id number](26) MMO TO FOLLOW ANGIE NO AUTH REQ     Assessment:  Patient is a 58 y.o. female who is 4 months s/p right CMC arthroplasty surgery. Patient was previously seen by me for therapy post-op. Presenting today for an re-eval as patient still feels weak and seeks some HEP for strengthening.    Patient presented with muscle weakness. She resides home independently with family, works as a wed developer (job tasks include typing). Meaningful leisure activity is reading. Provided patient resistance training and WB exercises with putty and free weight. Educated her on progression of HEP. Patient benefited from 1-time skilled OT for strengthening to support full return to all IADL, work, and leisure activities.     Plan:  Outpatient Plan  OT Plan: Patient benefited from 1-time OT visit for HEP  Plan of Care Agreement: Patient    Subjective   Current Problem:  Problem List Items Addressed This Visit          Musculoskeletal and Injuries    Osteoarthritis of carpometacarpal (CMC) joint of right thumb        DOI: No acute onset   Surgical Procedure: Right CMC arthroplasty   DOS:  2023    Hand Dominance: right    Affected Extremity: right    Mechanism of Injury: Patient proceeded with CMC arthroplasty surgery about 4 months ago. Was previously seen by me for therapy and was discharged. Presented today as she needs more strengthening exercises.     Precautions: none    Pain Assessment:  Location: Basal thumb and forearm   1/10 at rest, 6-7/10 at highest  Description: Sharp      Homeliving/Social Support: Living home with spouse and her daughter     Work:      Meaningful Activities:  "Reading     Previous Level of Function Per Patient/Caregiver Report: Independent with ADL, IADL, work and leisure activities    Chief complaint: Weakness     Patient stated goals for therapy: \"To be stronger, to be able to cut vegetables and shift my car to park.\"       Objective   UE Assessment  Observation: Minimal edema in right basal thumb.    Palpation: Tender to touch at incisional scar     Range of Motion (in degrees)  Shoulder AROM: WNL    PROM: WNL    Elbow AROM: WNL     PROM:WNL      Wrist AROM: WNL       PROM:WNL      Digits AROM: WNL    PROM:WNL      Thumb AROM: WFL   PROM:  same with AROM    Sensation: No paresthesia      Strength:   strength: R 22#,  L 40#  Lateral pinch strength: R 8#,  L 11#  3 point pinch strength: R 4#, L 11#     Special Test: N/A     Treatment Performed: Red putty  with forearm in various positions, lateral pinch, 3 point pinch, lateral pinch and pull. L bar putty press. Pizza cutter putty cutting. Free weight 2# wrist flex and ext.     Outcome Measures:  Quick Dash Score: at eval 43.18%    OP EDUCATION:  Education  Individual(s) Educated: Patient  Home Program: Strengthening, Handout issued  Patient/Caregiver Demonstrated Understanding: yes  Plan of Care Discussed and Agreed Upon: yes  Patient Response to Education: Patient/Caregiver Verbalized Understanding of Information, Patient/Caregiver Performed Return Demonstration of Exercises/Activities, Patient/Caregiver Asked Appropriate Questions     Goals:  1-time visit goal:  Patient to demonstrate proper technique and competence with HEP (met)            "

## 2024-04-23 ENCOUNTER — ALLIED HEALTH (OUTPATIENT)
Dept: INTEGRATIVE MEDICINE | Facility: CLINIC | Age: 59
End: 2024-04-23
Payer: COMMERCIAL

## 2024-04-23 DIAGNOSIS — M54.2 NECK PAIN: Primary | ICD-10-CM

## 2024-04-23 DIAGNOSIS — M79.672 FOOT PAIN, LEFT: ICD-10-CM

## 2024-04-23 PROCEDURE — 97810 ACUP 1/> WO ESTIM 1ST 15 MIN: CPT

## 2024-04-23 PROCEDURE — 97811 ACUP 1/> W/O ESTIM EA ADD 15: CPT

## 2024-04-23 NOTE — PROGRESS NOTES
Acupuncture Visit:     Subjective   Patient ID: Marion Montoya is a 58 y.o. female who presents for Foot Pain and Neck Pain    [facility] patient from [facility].  Last seen on June 9th 2023 when she sought support for thumb pain, hot flashes and neck pain.    Update: 04/23/24  Tx 1/10    CMC arthritis of the thumb resulted in surgery which she states was very helpful.    Dx with neuroma in left foot a couple of months ago  Received a couple of shots, but states was not helpful after a while.  Dx made a couple of months ago but states she has had numb toes for a while on and off.  States first shot was most helpful, second increased sx.    Patient also suffers from arthritis in her neck.  Neck pain with left turn (ipsilateral).  Shooting pain into the left occiput.    Has started going to pilates again since her surgery and notes it has been helpful with overall health and wellbeing.        Session Information  Is this acupuncture treatment being billed to the patient's insurance company: Yes  This is visit number: 1  The patient has a total number of visits of: 10  Initial Acupuncture Treatment date: 04/23/24  Name of Insurance Company: [employer]  Visit Type: Follow-up visit  Medical History Reviewed: I have reviewed pertinent medical history in EHR, and no contraindications are present to provide treatment         Review of Systems         Provider reviewed plan for the acupuncture session, precautions and contraindications. Patient/guardian/hospital staff has given consent to treat with full understanding of what to expect during the session. Before acupuncture began, provider explained to the patient to communicate at any time if the procedure was causing discomfort past their tolerance level. Patient agreed to advise acupuncturist. The acupuncturist counseled the patient on the risks of acupuncture treatment including pain, infection, bleeding, and no relief of pain. The patient was positioned comfortably. There  was no evidence of infection at the site of needle insertions.    Objective   Physical Exam         Treatment Plan  Treatment Goals: Pain management, Wellbeing improvement    Acupuncture Treatment  Patient Position: Supine on a table  Acupuncture Needling: Yes  Needle Guage: 36 guage /.20/ Blue seirin  Body Points: With retention  Body Points - Left: Baxie; LV3; GB41,42,43; SP4; ST43,44; KD1  Auricular Points: No  Electroacupuncture Used: No  Other Techniques Utilized: TDP Lamp, Gua sha, Topicals  Gua Sha Description: On left foot  Topicals Description: Mlaik Hughes  TDP Lamp Descripton: 20mins on left foot  Needle Count In: 12  Needle Count Out: 12  Needle Retention Time (min): 30 minutes  Total Face to Face Time (min): 30 minutes              Assessment/Plan

## 2024-04-30 ENCOUNTER — ALLIED HEALTH (OUTPATIENT)
Dept: INTEGRATIVE MEDICINE | Facility: CLINIC | Age: 59
End: 2024-04-30
Payer: COMMERCIAL

## 2024-04-30 DIAGNOSIS — M79.672 FOOT PAIN, LEFT: Primary | ICD-10-CM

## 2024-04-30 PROCEDURE — 97814 ACUP 1/> W/ESTIM EA ADDL 15: CPT

## 2024-04-30 PROCEDURE — 97813 ACUP 1/> W/ESTIM 1ST 15 MIN: CPT

## 2024-04-30 NOTE — PROGRESS NOTES
Acupuncture Visit:     Subjective   Patient ID: Marion Montoya is a 58 y.o. female who presents for Foot Pain    [facility] patient from [facility].  Last seen on June 9th 2023 when she sought support for thumb pain, hot flashes and neck pain.    Update: 04/30/24  Tx 2/10    Patient reports she observed no notable change in sx since her last visit.  Foot pain still present.  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    Update: 04/23/24  Tx 1/10    CMC arthritis of the thumb resulted in surgery which she states was very helpful.    Dx with neuroma in left foot a couple of months ago  Received a couple of shots, but states was not helpful after a while.  Dx made a couple of months ago but states she has had numb toes for a while on and off.  States first shot was most helpful, second increased sx.    Patient also suffers from arthritis in her neck.  Neck pain with left turn (ipsilateral).  Shooting pain into the left occiput.    Has started going to pilates again since her surgery and notes it has been helpful with overall health and wellbeing.      Session Information  Is this acupuncture treatment being billed to the patient's insurance company: Yes  This is visit number: 2  The patient has a total number of visits of: 10  Initial Acupuncture Treatment date: 04/23/24  Last Treatment date: 04/23/24  Name of Insurance Company: [employer]  Visit Type: Follow-up visit  Medical History Reviewed: I have reviewed pertinent medical history in EHR, and no contraindications are present to provide treatment         Review of Systems         Provider reviewed plan for the acupuncture session, precautions and contraindications. Patient/guardian/hospital staff has given consent to treat with full understanding of what to expect during the session. Before acupuncture began, provider explained to the patient to communicate at any time if the procedure was causing discomfort past their tolerance level. Patient agreed  to advise acupuncturist. The acupuncturist counseled the patient on the risks of acupuncture treatment including pain, infection, bleeding, and no relief of pain. The patient was positioned comfortably. There was no evidence of infection at the site of needle insertions.    Objective   Physical Exam         Treatment Plan  Treatment Goals: Pain management, Wellbeing improvement, Relaxation, Stress reduction    Acupuncture Treatment  Patient Position: Supine on a table  Acupuncture Needling: Yes  Needle Guage: 36 guage /.20/ Blue seirin  Body Points: With retention  Body Points - Left: LV2,3; GB34,41,43; Bafeng; ST36,43,44  Body Points - Bilateral: GV20  Auricular Points: No  Electroacupuncture Used: Yes  Electroacupuncture Points Used: R LV3-GB41  Electroacupuncture Frequency: Continuous Hz  Electroacupuncture Intensity: 2  Electroacupuncture Frequency in Hertz 1: 2  Other Techniques Utilized: Topicals, TDP Lamp, Massage  Massage Description: Light pressure on left foot  Topicals Description: Malik Hughes  TDP Lamp Descripton: 20mins on Left foot  Needle Count In: 13  Needle Count Out: 13  Needle Retention Time (min): 30 minutes  Total Face to Face Time (min): 30 minutes              Assessment/Plan

## 2024-05-07 ENCOUNTER — ALLIED HEALTH (OUTPATIENT)
Dept: INTEGRATIVE MEDICINE | Facility: CLINIC | Age: 59
End: 2024-05-07
Payer: COMMERCIAL

## 2024-05-07 DIAGNOSIS — M79.672 FOOT PAIN, LEFT: Primary | ICD-10-CM

## 2024-05-07 PROCEDURE — 97814 ACUP 1/> W/ESTIM EA ADDL 15: CPT

## 2024-05-07 PROCEDURE — 97813 ACUP 1/> W/ESTIM 1ST 15 MIN: CPT

## 2024-05-07 NOTE — PROGRESS NOTES
Acupuncture Visit:     Subjective   Patient ID: Marion Montoya is a 58 y.o. female who presents for Foot Pain    [facility] patient from [facility].  Last seen on June 9th 2023 when she sought support for thumb pain, hot flashes and neck pain.    Update: 05/07/24  Tx 3/10    Patient reports her foot still hurts.  She started using toe spacers to help create more room in between her toes but states while they provide the spacing, they also hurt the balls of her feet.      We discussed the use of an epsom salt soak to which patient agreed to give a try as she prepares for a long flight to her son's graduation in [place].    Update: 04/30/24  Tx 2/10    Patient reports she observed no notable change in sx since her last visit.  Foot pain still present.  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    Update: 04/23/24  Tx 1/10    CMC arthritis of the thumb resulted in surgery which she states was very helpful.    Dx with neuroma in left foot a couple of months ago  Received a couple of shots, but states was not helpful after a while.  Dx made a couple of months ago but states she has had numb toes for a while on and off.  States first shot was most helpful, second increased sx.    Patient also suffers from arthritis in her neck.  Neck pain with left turn (ipsilateral).  Shooting pain into the left occiput.    Has started going to pilates again since her surgery and notes it has been helpful with overall health and wellbeing.        Session Information  Is this acupuncture treatment being billed to the patient's insurance company: Yes  This is visit number: 3  The patient has a total number of visits of: 10  Initial Acupuncture Treatment date: 04/23/24  Last Treatment date: 04/30/24  Name of Insurance Company: [employer]  [employer]  Visit Type: Follow-up visit  Medical History Reviewed: I have reviewed pertinent medical history in EHR, and no contraindications are present to provide treatment         Review of  Systems         Provider reviewed plan for the acupuncture session, precautions and contraindications. Patient/guardian/hospital staff has given consent to treat with full understanding of what to expect during the session. Before acupuncture began, provider explained to the patient to communicate at any time if the procedure was causing discomfort past their tolerance level. Patient agreed to advise acupuncturist. The acupuncturist counseled the patient on the risks of acupuncture treatment including pain, infection, bleeding, and no relief of pain. The patient was positioned comfortably. There was no evidence of infection at the site of needle insertions.    Objective   Physical Exam         Treatment Plan  Treatment Goals: Pain management, Wellbeing improvement    Acupuncture Treatment  Patient Position: Supine on a table  Acupuncture Needling: Yes  Needle Guage: 36 guage /.20/ Blue seirin  Body Points: With retention  Body Points - Left: LV2,3; GB34,41,43; Bafeng; ST36,43,44  Auricular Points: No  Electroacupuncture Used: Yes  Electroacupuncture Points Used: R LV3-GB41  Electroacupuncture Frequency: Continuous Hz  Electroacupuncture Intensity: 2  Electroacupuncture Frequency in Hertz 1: 2  Other Techniques Utilized: Topicals, TDP Lamp, Massage  Massage Description: Light pressure on left foot  Topicals Description: Malik Hughes  TDP Lamp Descripton: 20mins on Left foot  Needle Count In: 12  Needle Count Out: 12  Needle Retention Time (min): 30 minutes  Total Face to Face Time (min): 25 minutes              Assessment/Plan

## 2024-05-30 ENCOUNTER — TELEMEDICINE (OUTPATIENT)
Dept: INTEGRATIVE MEDICINE | Facility: CLINIC | Age: 59
End: 2024-05-30
Payer: COMMERCIAL

## 2024-05-30 DIAGNOSIS — R12 HEARTBURN: ICD-10-CM

## 2024-05-30 DIAGNOSIS — G57.62 MORTON'S NEUROMA OF LEFT FOOT: Primary | ICD-10-CM

## 2024-05-30 DIAGNOSIS — Z71.89 ENCOUNTER FOR HERB AND VITAMIN SUPPLEMENT MANAGEMENT: ICD-10-CM

## 2024-05-30 PROCEDURE — 99203 OFFICE O/P NEW LOW 30 MIN: CPT | Performed by: PHYSICIAN ASSISTANT

## 2024-05-30 NOTE — PROGRESS NOTES
57 yo female with PMH OA and Fontenot's neuroma presents for initial consultation. Referred by acupuncture colleague. Work-  for [employer], [employer].     Goal of Visit: pain management; gerd    Schedule: [facility]- 8-4. AM- tea, meeting with Rocío, break to journal, lunch. PM- meetings, dinner with , occ wine.     Somatic Complaints:  - Headaches- from allergies, rare  - Neck pain- L sided, traction helps   - GERD- triggered by spicy foods, dairy, coffee?  - Gut health risk factors- +NSAIDs  - Planning for breast reconstruction July 11th  - L Fontenot's neuroma- between 3-4th toes x few years, worsened in the last year-- limiting her from walking- has tried two cortisone shots, acupuncture-- has to wear shoes (Birkenstocks) and inserts PRN; has seen general ortho, not podiatry   - Nails weaker       Meds/Supplements:  Vitamin D3/K2- 4000 U total  Famotidine   B complex   Hair, Skin, nails softgels  Calm gummies- magnesium, L-theanine   Psyllium husk PRN   Vaginal estrogen      Reviewed conservative management for Fontenot's neuroma.  Recommend to try PT exercises, continue with wide toed shoes and support.  Also add conservative care with  Epsom salt soaks and/or topical magnesium.  Can consider infrared light therapy if financially feasible.  Plan to check B12 and folate due to nerve related conditions.    Marion also questioned treatment for GERD.  Can try DGL Plus 30 minutes before breakfast daily x 2 weeks, then PRN.  Safer than PPI use.  No contraindications with current medications/supplements or health history. Recommend to begin in stepwise fashion to determine tolerance and efficacy.        Assessment/Plan:  DGL Plus   PT  Epsom salt soaks   Infrared light ?  B12, folate   SEE PATIENT INSTRUCTIONS        Review of Systems:  Constitutional: afebrile  Respiratory: no shortness of breath  Cardiovascular: no chest  pain  Abdominal: +reflux  Musculoskeletal: +Fontenot's neuroma LLE  Skin: no  rash      Physical Exam:   General: alert and oriented; well-developed, well-nourished, no acute distress  HEENT: normocephalic, atraumatic, good eye contact  Respiratory: no labored breathing, no conversational dyspnea  Psych: pleasant affect, cooperative

## 2024-06-03 NOTE — PATIENT INSTRUCTIONS
Check B12 and folate labs at your earliest convenience  Hold B complex for 3 days prior to lab draw  Basic Fontenot Neuroma exercises:  Toe spread and squeeze  Spread your toes as wide as possible, hold for a few seconds, then squeeze your toes together as if you're picking up something from the floor. Repeat 10 times.  Big toe stretch  While sitting, wrap an exercise band around your big toe, extend your leg, and gently pull your foot back with the band. Then, use your big toe to push your foot forward. Repeat three times.  Towel scrunch  Place a towel on the floor and put your foot on the end closest to you. Use your toes to pull the towel toward you. You can make it more difficult by putting a weight on the other end of the towel.  Resources:  [email or web address]~:text=Toe%20Spread%20and%20Squeeze:%20This,to%20discuss%20their%20game%20plan.   Conservative pain management:  Epsom salt foot soaks in warm water for 10-15 minutes before doing above exercises   Cold soaks or ice pack for acute pain   Topical magnesium   There is limited evidence for supplements  or red light therapy reducing Fontenot's Neuroma pain.  Consider physical therapy when time permits  Supplement: (sent through [employer])   DGL Plus--use 30 minutes before breakfast x 2 weeks, then as needed for heartburn.